# Patient Record
Sex: MALE | Race: WHITE | Employment: FULL TIME | URBAN - METROPOLITAN AREA
[De-identification: names, ages, dates, MRNs, and addresses within clinical notes are randomized per-mention and may not be internally consistent; named-entity substitution may affect disease eponyms.]

---

## 2022-01-09 ENCOUNTER — HOSPITAL ENCOUNTER (EMERGENCY)
Age: 22
Discharge: HOME | End: 2022-01-09
Attending: EMERGENCY MEDICINE

## 2022-01-09 VITALS
RESPIRATION RATE: 22 BRPM | BODY MASS INDEX: 38.43 KG/M2 | TEMPERATURE: 98.4 F | HEIGHT: 73 IN | OXYGEN SATURATION: 98 % | DIASTOLIC BLOOD PRESSURE: 97 MMHG | WEIGHT: 290 LBS | HEART RATE: 81 BPM | SYSTOLIC BLOOD PRESSURE: 156 MMHG

## 2022-01-09 DIAGNOSIS — K52.9 GASTROENTERITIS: Primary | ICD-10-CM

## 2022-01-09 PROCEDURE — 99282 EMERGENCY DEPT VISIT SF MDM: CPT

## 2022-01-09 ASSESSMENT — ENCOUNTER SYMPTOMS
DIARRHEA: 0
BLOOD IN STOOL: 0
ABDOMINAL DISTENTION: 0
SHORTNESS OF BREATH: 0
PHOTOPHOBIA: 0
WHEEZING: 0
RHINORRHEA: 0
VOMITING: 0
SORE THROAT: 0
ABDOMINAL PAIN: 0
COUGH: 0

## 2022-01-09 NOTE — Clinical Note
Cecille Noel was seen and treated in our emergency department on 1/9/2022. He may return to work on 01/10/2022. If you have any questions or concerns, please don't hesitate to call.       Traci Flynn MD

## 2022-01-09 NOTE — Clinical Note
Munir Claire was seen and treated in our emergency department on 1/9/2022. He may return to work on 01/10/2022. He was sick on from 1/7/2021 till 1/9/2021     If you have any questions or concerns, please don't hesitate to call.       Saul Jonas MD

## 2022-01-09 NOTE — Clinical Note
Oscar Norman was seen and treated in our emergency department on 1/9/2022. He may return to work on 01/10/2022. He was sick on from 1/7/2021 till 1/9/2021     If you have any questions or concerns, please don't hesitate to call.       Lore Ruiz MD

## 2022-01-10 NOTE — ED NOTES
This patient was assessed by the doctor only. Nurse processed and completed the orders from this doctor ie labs, meds, and/or EKG.         Bear Mcclellan RN  01/09/22 [id number]

## 2022-01-10 NOTE — ED PROVIDER NOTES
[facility] ED  Emergency Department Encounter  EmergencyMedicine Resident     Pt Name:Tom Eagle  MRN: [id number]  [place] 2000  Date of evaluation: 1/9/22  PCP:  No primary care provider on file. This patient was evaluated in the Emergency Department for symptoms described in the history of present illness. The patient was evaluated in the context of the global COVID-19 pandemic, which necessitated consideration that the patient might be at risk for infection with the SARS-CoV-2 virus that causes COVID-19. Institutional protocols and algorithms that pertain to the evaluation of patients at risk for COVID-19 are in a state of rapid change based on information released by regulatory bodies including the CDC and federal and state organizations. These policies and algorithms were followed during the patient's care in the ED. CHIEF COMPLAINT       Chief Complaint   Patient presents with    Emesis    Diarrhea       HISTORY OF PRESENT ILLNESS  (Location/Symptom, Timing/Onset, Context/Setting, Quality, Duration, Modifying Factors, Severity.)      Orville Singh is a 22 y.o. male who presents with history of vomiting x 8, on 1/7/2022, following a meal from a restaurant; then watery diarrhea, brownish, x5 yesterday. Last episode at 1 AM today. Has had a normal bowel movement following that. He came to check and make sure he was okay before returning to work. None of his other friends got sick from the meal.  FaceTime he is having such episodes    No significant past medical history. No history of celiac disease or irritable bowel syndrome. Originally from 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 Uses a respirator at work. Does not smoke cigarettes or marijuana no use cocaine. Occasional alcohol consumption. PAST MEDICAL / SURGICAL / SOCIAL / FAMILY HISTORY      has no past medical history on file. has no past surgical history on file.       Social History Socioeconomic History    Marital status: Single     Spouse name: Not on file    Number of children: Not on file    Years of education: Not on file    Highest education level: Not on file   Occupational History    Not on file   Tobacco Use    Smoking status: Not on file    Smokeless tobacco: Not on file   Substance and Sexual Activity    Alcohol use: Not on file    Drug use: Not on file    Sexual activity: Not on file   Other Topics Concern    Not on file   Social History Narrative    Not on file     Social Determinants of Health     Financial Resource Strain:     Difficulty of Paying Living Expenses: Not on file   Food Insecurity:     Worried About Running Out of Food in the Last Year: Not on file    Ruperto of Food in the Last Year: Not on file   Transportation Needs:     Lack of Transportation (Medical): Not on file    Lack of Transportation (Non-Medical): Not on file   Physical Activity:     Days of Exercise per Week: Not on file    Minutes of Exercise per Session: Not on file   Stress:     Feeling of Stress : Not on file   Social Connections:     Frequency of Communication with Friends and Family: Not on file    Frequency of Social Gatherings with Friends and Family: Not on file    Attends Druze Services: Not on file    Active Member of [address] [place] [address] [employer] or Organizations: Not on file    Attends Club or Organization Meetings: Not on file    Marital Status: Not on file   Intimate Partner Violence:     Fear of Current or Ex-Partner: Not on file    Emotionally Abused: Not on file    Physically Abused: Not on file    Sexually Abused: Not on file   Housing Stability:     Unable to Pay for Housing in the Last Year: Not on file    Number of [place] in the Last Year: Not on file    Unstable Housing in the Last Year: Not on file       History reviewed. No pertinent family history.     Allergies:  Amoxicillin    Home Medications:  Prior to Admission medications    Not on File       REVIEW OF SYSTEMS    (2-9 systems for level 4, 10 or more for level 5)      Review of Systems   Constitutional: Negative for activity change, fatigue and fever. HENT: Negative for congestion, rhinorrhea and sore throat. Eyes: Negative for photophobia and visual disturbance. Respiratory: Negative for cough, shortness of breath and wheezing. Cardiovascular: Negative for chest pain and palpitations. Gastrointestinal: Negative for abdominal distention, abdominal pain, blood in stool, diarrhea and vomiting. Genitourinary: Negative for dysuria and hematuria. Musculoskeletal: Negative for arthralgias, joint swelling and myalgias. Allergic/Immunologic: Negative for environmental allergies and food allergies. Neurological: Negative for dizziness, syncope and weakness. Psychiatric/Behavioral: Negative for behavioral problems and hallucinations. PHYSICAL EXAM   (up to 7 for level 4, 8 or more for level 5)      INITIAL VITALS:   BP (!) 156/97   Pulse 81   Temp 98.4 °F (36.9 °C)   Resp 22   Ht 6' 1\" (1.854 m)   Wt 290 lb (131.5 kg)   SpO2 98%   BMI 38.26 kg/m²     Physical Exam  Constitutional:  Well developed, Well nourished. obese   HENT:  Normocephalic, Atraumatic, Bilateral external ears normal,  Nose normal.   Neck: Normal range of motion, No stridor. Eyes:   No discharge. Respiratory:   No respiratory distress, Normal breath sounds without any wheezing, rales or rhonchi. Cardiovascular: Normal S1, S2. No rubs, gallops or murmurs. Gastrointestinal:  No organomegaly, no tenderness, no rebound or guarding. Musculoskeletal:  No extremity deformity. Lymphatic: No lymphadenopathy noted  Skin:  Warm, Dry,  No rash. Neurologic:  Alert & oriented x 3, Normal motor function,  No focal deficits noted.    Psychiatric:  Affect normal, Judgment normal, Mood normal.              DIFFERENTIAL  DIAGNOSIS     PLAN (LABS / IMAGING / EKG):  No orders of the defined types were placed in this encounter. MEDICATIONS ORDERED:  No orders of the defined types were placed in this encounter. DDX: Resolved Gastroenteritis; probably from food poisoning    DIAGNOSTIC RESULTS / EMERGENCY DEPARTMENT COURSE / MDM   LAB RESULTS:  No results found for this visit on 01/09/22. RADIOLOGY:  No results found. IMPRESSION: Resolved Gastroenteritis; probably from food poisoning      EMERGENCY DEPARTMENT COURSE:       - work note         PROCEDURES:  none    CONSULTS:  None        FINAL IMPRESSION      1. Gastroenteritis          DISPOSITION / PLAN     DISPOSITION Decision To Discharge 01/09/2022 07:37:35 PM      PATIENT REFERRED TO:  Roslynn Runner, MD  [address] [place] [address] 909 [phone]    Schedule an appointment as soon as possible for a visit in 1 week  As needed      DISCHARGE MEDICATIONS:  There are no discharge medications for this patient.       Roslynn Runner, MD  Emergency Medicine Resident    (Please note that portions of thisnote were completed with a voice recognition program.  Efforts were made to edit the dictations but occasionally words are mis-transcribed.)        Roslynn Runner, MD  Resident  01/09/22 2002

## 2022-01-10 NOTE — ED PROVIDER NOTES
[facility]     Emergency Department     Faculty Attestation    I performed a history and physical examination of the patient and discussed management with the resident. I reviewed the resident´s note and agree with the documented findings and plan of care. Any areas of disagreement are noted on the chart. I was personally present for the key portions of any procedures. I have documented in the chart those procedures where I was not present during the key portions. I have reviewed the emergency nurses triage note. I agree with the chief complaint, past medical history, past surgical history, allergies, medications, social and family history as documented unless otherwise noted below. For Physician Assistant/ Nurse Practitioner cases/documentation I have personally evaluated this patient and have completed at least one if not all key elements of the E/M (history, physical exam, and MDM). Additional findings are as noted. Patient had diarrhea than vomiting over the past couple days after eating chicken from wing stop. Patient has history of appendectomy. Patient states he feels back to normal and just wants a work slip to get back to work.   On exam his abdomen soft and nontender, no peritoneal signs, bowel sounds normal.     Anson Bacon MD  01/09/22 [id number]